# Patient Record
(demographics unavailable — no encounter records)

---

## 2025-01-16 NOTE — PHYSICAL EXAM
[No Acute Distress] : no acute distress [Well Nourished] : well nourished [Well Developed] : well developed [Well-Appearing] : well-appearing [Normal Sclera/Conjunctiva] : normal sclera/conjunctiva [Normal Outer Ear/Nose] : the outer ears and nose were normal in appearance [Normal Oropharynx] : the oropharynx was normal [No JVD] : no jugular venous distention [No Lymphadenopathy] : no lymphadenopathy [Supple] : supple [Thyroid Normal, No Nodules] : the thyroid was normal and there were no nodules present [No Respiratory Distress] : no respiratory distress  [No Accessory Muscle Use] : no accessory muscle use [Clear to Auscultation] : lungs were clear to auscultation bilaterally [Normal Rate] : normal rate  [Regular Rhythm] : with a regular rhythm [Normal S1, S2] : normal S1 and S2 [I] : a grade 1 [No Carotid Bruits] : no carotid bruits [No Abdominal Bruit] : a ~M bruit was not heard ~T in the abdomen [No Varicosities] : no varicosities [Pedal Pulses Present] : the pedal pulses are present [No Edema] : there was no peripheral edema [No Palpable Aorta] : no palpable aorta [No Extremity Clubbing/Cyanosis] : no extremity clubbing/cyanosis [Normal Appearance] : normal in appearance [No Nipple Discharge] : no nipple discharge [No Axillary Lymphadenopathy] : no axillary lymphadenopathy [Soft] : abdomen soft [Non Tender] : non-tender [Non-distended] : non-distended [No Masses] : no abdominal mass palpated [No HSM] : no HSM [Normal Bowel Sounds] : normal bowel sounds [No CVA Tenderness] : no CVA  tenderness [No Spinal Tenderness] : no spinal tenderness [No Joint Swelling] : no joint swelling [Grossly Normal Strength/Tone] : grossly normal strength/tone [No Rash] : no rash [Coordination Grossly Intact] : coordination grossly intact [No Focal Deficits] : no focal deficits [Normal Affect] : the affect was normal [Normal Insight/Judgement] : insight and judgment were intact [de-identified] : Mildly depressed

## 2025-01-16 NOTE — HEALTH RISK ASSESSMENT
[Very Good] : ~his/her~  mood as very good [No] : No [No falls in past year] : Patient reported no falls in the past year [PHQ-2 Negative - No further assessment needed] : PHQ-2 Negative - No further assessment needed [Never] : Never [NO] : No [Patient declined mammogram] : Patient declined mammogram [Fully functional (bathing, dressing, toileting, transferring, walking, feeding)] : Fully functional (bathing, dressing, toileting, transferring, walking, feeding) [Independent] : managing medications [Some assistance needed] : managing finances [1] : 1) Little interest or pleasure doing things for several days (1) [de-identified] : 8 minutes spent discussing mood and depression and issues related to this [[id number]] : 2 [Change in mental status noted] : No change in mental status noted [Language] : denies difficulty with language [Behavior] : denies difficulty with behavior [Learning/Retaining New Information] : denies difficulty learning/retaining new information [Handling Complex Tasks] : denies difficulty handling complex tasks [Reasoning] : denies difficulty with reasoning [Spatial Ability and Orientation] : denies difficulty with spatial ability and orientation [Alone] : lives alone

## 2025-01-16 NOTE — HISTORY OF PRESENT ILLNESS
[FreeTextEntry1] : Here for complete examination and to follow ongoing conditions [de-identified] : 92-year-old female with history of hypertension on medications, Transient ischemic attack on aspirin and rosuvastatin no recurrence January 28, 2024, lumbar spine and lower back issues Last seen April 22, 2024 with lower back and buttock pain debilitating and morning improved as day went on Has had past lumbar surgery  1/28/24, was cooking in kitchen couldn't get her words out wanted to say something, could not pronounce words lasted 5 minutes, then was fine son witnessed asymmetry of face Hospitalized January 30 through January 31, 2024 at [facility] Had extensive workup with MRI brain showing mild microvascular disease, MRA head and neck showed no large vessel occlusion or significant stenosis, echocardiogram showed ejection fraction 60% with no concerning abnormalities Subsequently had extended monitor that did not show any atrial fibrillation She was discharged on aspirin and rosuvastatin  has ovarian cyst, saw gyn 2018  felt to be fine to leave her alone, declines follow up activities are limited by decreased range of motion of her right shoulder unable to lift her arm up beyond 90 also bad arthritis in am some urinary leakage issues, this bother her a lot Hypertension on medications  Past prediabetes Status post bilateral total knee replacements doing well fairly independent

## 2025-01-16 NOTE — PLAN
[FreeTextEntry1] : To continue same blood pressure medicine statin and baby aspirin Mild depression pros and cons of treatment and expectations reviewed with the patient and daughter Will start sertraline and return visit in 1 month to check sodium and see how she has responded to this

## 2025-01-16 NOTE — ASSESSMENT
[FreeTextEntry1] : Mild depression  lumbar stenosis past lumbar disc surgery Hypertension well-controlled Past TIA no recurrence remains on aspirin and statin